# Patient Record
Sex: MALE | Race: BLACK OR AFRICAN AMERICAN | NOT HISPANIC OR LATINO | Employment: FULL TIME | URBAN - METROPOLITAN AREA
[De-identification: names, ages, dates, MRNs, and addresses within clinical notes are randomized per-mention and may not be internally consistent; named-entity substitution may affect disease eponyms.]

---

## 2022-05-18 ENCOUNTER — APPOINTMENT (OUTPATIENT)
Dept: GENERAL RADIOLOGY | Facility: HOSPITAL | Age: 42
End: 2022-05-18

## 2022-05-18 ENCOUNTER — HOSPITAL ENCOUNTER (EMERGENCY)
Facility: HOSPITAL | Age: 42
Discharge: HOME | End: 2022-05-18
Attending: EMERGENCY MEDICINE | Admitting: EMERGENCY MEDICINE

## 2022-05-18 VITALS
HEART RATE: 94 BPM | SYSTOLIC BLOOD PRESSURE: 126 MMHG | OXYGEN SATURATION: 93 % | HEIGHT: 76 IN | TEMPERATURE: 96.9 F | DIASTOLIC BLOOD PRESSURE: 68 MMHG | RESPIRATION RATE: 20 BRPM

## 2022-05-18 DIAGNOSIS — J45.901 EXACERBATION OF ASTHMA, UNSPECIFIED ASTHMA SEVERITY, UNSPECIFIED WHETHER PERSISTENT: Primary | ICD-10-CM

## 2022-05-18 LAB
ALBUMIN SERPL-MCNC: 4.6 G/DL (ref 3.5–5.2)
ALBUMIN/GLOB SERPL: 1.8 G/DL
ALP SERPL-CCNC: 75 U/L (ref 39–117)
ALT SERPL W P-5'-P-CCNC: 20 U/L (ref 1–41)
ANION GAP SERPL CALCULATED.3IONS-SCNC: 11.6 MMOL/L (ref 5–15)
AST SERPL-CCNC: 26 U/L (ref 1–40)
BASOPHILS # BLD AUTO: 0.04 10*3/MM3 (ref 0–0.2)
BASOPHILS NFR BLD AUTO: 0.6 % (ref 0–1.5)
BILIRUB SERPL-MCNC: 0.5 MG/DL (ref 0–1.2)
BUN SERPL-MCNC: 11 MG/DL (ref 6–20)
BUN/CREAT SERPL: 8.7 (ref 7–25)
CALCIUM SPEC-SCNC: 9.3 MG/DL (ref 8.6–10.5)
CHLORIDE SERPL-SCNC: 106 MMOL/L (ref 98–107)
CO2 SERPL-SCNC: 25.4 MMOL/L (ref 22–29)
CREAT SERPL-MCNC: 1.26 MG/DL (ref 0.76–1.27)
DEPRECATED RDW RBC AUTO: 41.2 FL (ref 37–54)
EGFRCR SERPLBLD CKD-EPI 2021: 72.1 ML/MIN/1.73
EOSINOPHIL # BLD AUTO: 0.55 10*3/MM3 (ref 0–0.4)
EOSINOPHIL NFR BLD AUTO: 8.2 % (ref 0.3–6.2)
ERYTHROCYTE [DISTWIDTH] IN BLOOD BY AUTOMATED COUNT: 12.7 % (ref 12.3–15.4)
GLOBULIN UR ELPH-MCNC: 2.6 GM/DL
GLUCOSE SERPL-MCNC: 61 MG/DL (ref 65–99)
HCT VFR BLD AUTO: 46.9 % (ref 37.5–51)
HGB BLD-MCNC: 15.4 G/DL (ref 13–17.7)
IMM GRANULOCYTES # BLD AUTO: 0.01 10*3/MM3 (ref 0–0.05)
IMM GRANULOCYTES NFR BLD AUTO: 0.1 % (ref 0–0.5)
LYMPHOCYTES # BLD AUTO: 3.4 10*3/MM3 (ref 0.7–3.1)
LYMPHOCYTES NFR BLD AUTO: 50.7 % (ref 19.6–45.3)
MCH RBC QN AUTO: 29.2 PG (ref 26.6–33)
MCHC RBC AUTO-ENTMCNC: 32.8 G/DL (ref 31.5–35.7)
MCV RBC AUTO: 88.8 FL (ref 79–97)
MONOCYTES # BLD AUTO: 0.58 10*3/MM3 (ref 0.1–0.9)
MONOCYTES NFR BLD AUTO: 8.6 % (ref 5–12)
NEUTROPHILS NFR BLD AUTO: 2.13 10*3/MM3 (ref 1.7–7)
NEUTROPHILS NFR BLD AUTO: 31.8 % (ref 42.7–76)
NRBC BLD AUTO-RTO: 0 /100 WBC (ref 0–0.2)
PLATELET # BLD AUTO: 148 10*3/MM3 (ref 140–450)
PMV BLD AUTO: 12.4 FL (ref 6–12)
POTASSIUM SERPL-SCNC: 3.5 MMOL/L (ref 3.5–5.2)
PROT SERPL-MCNC: 7.2 G/DL (ref 6–8.5)
QT INTERVAL: 362 MS
RBC # BLD AUTO: 5.28 10*6/MM3 (ref 4.14–5.8)
SODIUM SERPL-SCNC: 143 MMOL/L (ref 136–145)
TROPONIN T SERPL-MCNC: <0.01 NG/ML (ref 0–0.03)
WBC NRBC COR # BLD: 6.71 10*3/MM3 (ref 3.4–10.8)

## 2022-05-18 PROCEDURE — 99284 EMERGENCY DEPT VISIT MOD MDM: CPT

## 2022-05-18 PROCEDURE — 94640 AIRWAY INHALATION TREATMENT: CPT

## 2022-05-18 PROCEDURE — 93005 ELECTROCARDIOGRAM TRACING: CPT | Performed by: EMERGENCY MEDICINE

## 2022-05-18 PROCEDURE — 93010 ELECTROCARDIOGRAM REPORT: CPT | Performed by: INTERNAL MEDICINE

## 2022-05-18 PROCEDURE — 80053 COMPREHEN METABOLIC PANEL: CPT | Performed by: EMERGENCY MEDICINE

## 2022-05-18 PROCEDURE — 71045 X-RAY EXAM CHEST 1 VIEW: CPT

## 2022-05-18 PROCEDURE — 94799 UNLISTED PULMONARY SVC/PX: CPT

## 2022-05-18 PROCEDURE — 84484 ASSAY OF TROPONIN QUANT: CPT | Performed by: EMERGENCY MEDICINE

## 2022-05-18 PROCEDURE — 96374 THER/PROPH/DIAG INJ IV PUSH: CPT

## 2022-05-18 PROCEDURE — 85025 COMPLETE CBC W/AUTO DIFF WBC: CPT | Performed by: EMERGENCY MEDICINE

## 2022-05-18 PROCEDURE — 25010000002 METHYLPREDNISOLONE PER 125 MG: Performed by: EMERGENCY MEDICINE

## 2022-05-18 PROCEDURE — 94761 N-INVAS EAR/PLS OXIMETRY MLT: CPT

## 2022-05-18 PROCEDURE — 94664 DEMO&/EVAL PT USE INHALER: CPT

## 2022-05-18 RX ORDER — ALBUTEROL SULFATE 2.5 MG/3ML
SOLUTION RESPIRATORY (INHALATION)
Status: COMPLETED
Start: 2022-05-18 | End: 2022-05-18

## 2022-05-18 RX ORDER — METHYLPREDNISOLONE SODIUM SUCCINATE 125 MG/2ML
125 INJECTION, POWDER, LYOPHILIZED, FOR SOLUTION INTRAMUSCULAR; INTRAVENOUS ONCE
Status: COMPLETED | OUTPATIENT
Start: 2022-05-18 | End: 2022-05-18

## 2022-05-18 RX ORDER — ALBUTEROL SULFATE 2.5 MG/3ML
10 SOLUTION RESPIRATORY (INHALATION)
Status: COMPLETED | OUTPATIENT
Start: 2022-05-18 | End: 2022-05-18

## 2022-05-18 RX ORDER — SODIUM CHLORIDE 0.9 % (FLUSH) 0.9 %
10 SYRINGE (ML) INJECTION AS NEEDED
Status: DISCONTINUED | OUTPATIENT
Start: 2022-05-18 | End: 2022-05-18 | Stop reason: HOSPADM

## 2022-05-18 RX ORDER — ALBUTEROL SULFATE 90 UG/1
2 AEROSOL, METERED RESPIRATORY (INHALATION) EVERY 4 HOURS PRN
Qty: 18 G | Refills: 3 | Status: SHIPPED | OUTPATIENT
Start: 2022-05-18

## 2022-05-18 RX ORDER — PREDNISONE 50 MG/1
50 TABLET ORAL DAILY
Qty: 7 TABLET | Refills: 0 | OUTPATIENT
Start: 2022-05-18 | End: 2022-10-15

## 2022-05-18 RX ADMIN — IPRATROPIUM BROMIDE 0.5 MG: 0.5 SOLUTION RESPIRATORY (INHALATION) at 06:33

## 2022-05-18 RX ADMIN — ALBUTEROL SULFATE 10 MG: 2.5 SOLUTION RESPIRATORY (INHALATION) at 06:32

## 2022-05-18 RX ADMIN — METHYLPREDNISOLONE SODIUM SUCCINATE 125 MG: 125 INJECTION, POWDER, FOR SOLUTION INTRAMUSCULAR; INTRAVENOUS at 06:25

## 2022-05-18 NOTE — ED NOTES
"Pt states that he ran out of his usual inhaler and said that the albuterol with the \"orange cap\" does not work well for him and that the inhaler with the \"blue cap\" is the inhaler that he ran out of that normally works when he becomes short of breath. Pt has been typing on his phone to communicate due to his shortness of breath   "

## 2022-05-18 NOTE — ED TRIAGE NOTES
Patient ambulated to ambulance bay doors and then fell to floor and motioned that he can not breath. Patient unable to form sentences, answers questions with gestures. Patient in tripod position. Reports he has asthma and has used his rescue inhaler without relief. He reports symptoms have been ongoing all week. MD assessed patient in ambulance bay, patient was moved via stretcher to room 10. RT paged.    Triage staff wearing appropriate PPE.

## 2022-05-18 NOTE — ED PROVIDER NOTES
EMERGENCY DEPARTMENT ENCOUNTER    Room Number:  10/10  Date of encounter:  5/18/2022  PCP: Provider, No Known  Historian: Patient      I used full protective equipment while examining this patient.  This includes face mask, gloves and protective eyewear.  I washed my hands before entering the room and immediately upon leaving the room      HPI:  Chief Complaint: Shortness of breath  A complete HPI/ROS/PMH/PSH/SH/FH are unobtainable due to: Nothing    Context: Gil Galicia is a 42 y.o. male who presents to the ED c/o worsening shortness of breath.  Patient states over the past several days he has had continuously worsening shortness of breath.  He has a difficult time speaking secondary to shortness of breath.  He states he has a history of asthma and has been without his rescue inhaler.  Patient actually walked up to the ambulance door and came in to the ER.  He denies any overt fever, chest pain.  He does not take any inhaled steroids.  He states he has never been intubated for his asthma.  He is new to the area and does not have a family doctor.    Review of Medical Records  No previous pertinent medical records found in [facility].    PAST MEDICAL HISTORY  Active Ambulatory Problems     Diagnosis Date Noted   • No Active Ambulatory Problems     Resolved Ambulatory Problems     Diagnosis Date Noted   • No Resolved Ambulatory Problems     No Additional Past Medical History         PAST SURGICAL HISTORY  No past surgical history on file.      FAMILY HISTORY  No family history on file.      SOCIAL HISTORY  Social History     Socioeconomic History   • Marital status: Single         ALLERGIES  Patient has no known allergies.        REVIEW OF SYSTEMS  All systems reviewed and negative except for those discussed in HPI.       PHYSICAL EXAM    I have reviewed the triage vital signs and nursing notes.    ED Triage Vitals [05/18/22 0633]   Temp Heart Rate Resp BP SpO2   -- 90 -- -- 95 %      Temp src Heart Rate Source Patient  Position BP Location FiO2 (%)   -- -- -- -- --       Physical Exam  GENERAL: Alert, oriented, moderate respiratory distress  HENT: head atraumatic, no nuchal rigidity  EYES: no scleral icterus, EOMI  CV: regular rhythm, regular rate, no murmur  RESPIRATORY: Poor airway movement,  only able to speak in short sentences.  Using accessory muscles.  Diffuse, moderate wheezing.  ABDOMEN: soft, nontender  MUSCULOSKELETAL: no deformity, FROM, no calf swelling or tenderness  NEURO: alert, moves all extremities, follows commands  SKIN: warm, dry        LAB RESULTS  Recent Results (from the past 24 hour(s))   Comprehensive Metabolic Panel    Collection Time: 05/18/22  6:28 AM    Specimen: Blood   Result Value Ref Range    Glucose 61 (L) 65 - 99 mg/dL    BUN 11 6 - 20 mg/dL    Creatinine 1.26 0.76 - 1.27 mg/dL    Sodium 143 136 - 145 mmol/L    Potassium 3.5 3.5 - 5.2 mmol/L    Chloride 106 98 - 107 mmol/L    CO2 25.4 22.0 - 29.0 mmol/L    Calcium 9.3 8.6 - 10.5 mg/dL    Total Protein 7.2 6.0 - 8.5 g/dL    Albumin 4.60 3.50 - 5.20 g/dL    ALT (SGPT) 20 1 - 41 U/L    AST (SGOT) 26 1 - 40 U/L    Alkaline Phosphatase 75 39 - 117 U/L    Total Bilirubin 0.5 0.0 - 1.2 mg/dL    Globulin 2.6 gm/dL    A/G Ratio 1.8 g/dL    BUN/Creatinine Ratio 8.7 7.0 - 25.0    Anion Gap 11.6 5.0 - 15.0 mmol/L    eGFR 72.1 >60.0 mL/min/1.73   Troponin    Collection Time: 05/18/22  6:28 AM    Specimen: Blood   Result Value Ref Range    Troponin T <0.010 0.000 - 0.030 ng/mL   CBC Auto Differential    Collection Time: 05/18/22  6:28 AM    Specimen: Blood   Result Value Ref Range    WBC 6.71 3.40 - 10.80 10*3/mm3    RBC 5.28 4.14 - 5.80 10*6/mm3    Hemoglobin 15.4 13.0 - 17.7 g/dL    Hematocrit 46.9 37.5 - 51.0 %    MCV 88.8 79.0 - 97.0 fL    MCH 29.2 26.6 - 33.0 pg    MCHC 32.8 31.5 - 35.7 g/dL    RDW 12.7 12.3 - 15.4 %    RDW-SD 41.2 37.0 - 54.0 fl    MPV 12.4 (H) 6.0 - 12.0 fL    Platelets 148 140 - 450 10*3/mm3    Neutrophil % 31.8 (L) 42.7 - 76.0 %     Lymphocyte % 50.7 (H) 19.6 - 45.3 %    Monocyte % 8.6 5.0 - 12.0 %    Eosinophil % 8.2 (H) 0.3 - 6.2 %    Basophil % 0.6 0.0 - 1.5 %    Immature Grans % 0.1 0.0 - 0.5 %    Neutrophils, Absolute 2.13 1.70 - 7.00 10*3/mm3    Lymphocytes, Absolute 3.40 (H) 0.70 - 3.10 10*3/mm3    Monocytes, Absolute 0.58 0.10 - 0.90 10*3/mm3    Eosinophils, Absolute 0.55 (H) 0.00 - 0.40 10*3/mm3    Basophils, Absolute 0.04 0.00 - 0.20 10*3/mm3    Immature Grans, Absolute 0.01 0.00 - 0.05 10*3/mm3    nRBC 0.0 0.0 - 0.2 /100 WBC   ECG 12 Lead    Collection Time: 05/18/22  7:36 AM   Result Value Ref Range    QT Interval 362 ms       Ordered the above labs and independently reviewed the results.        RADIOLOGY  XR Chest 1 View    Result Date: 5/18/2022  PORTABLE CHEST X-RAY  HISTORY: Shortness of breath.  Portable chest x-ray is provided. Correlation: None.  FINDINGS: The cardiomediastinal silhouette is normal. The lungs are clear. The costophrenic sulci are dry and the bones appear normal. There is no pneumothorax.      Negative.  This report was finalized on 5/18/2022 7:56 AM by Dr. Landon Santiago M.D.        I ordered the above noted radiological studies. Reviewed by me and discussed with radiologist.  See dictation for official radiology interpretation.      MEDICATIONS GIVEN IN ER    Medications   sodium chloride 0.9 % flush 10 mL (has no administration in time range)   methylPREDNISolone sodium succinate (SOLU-Medrol) injection 125 mg (125 mg Intravenous Given 5/18/22 0625)   albuterol (PROVENTIL) nebulizer solution 0.083% 2.5 mg/3mL (10 mg Nebulization Given 5/18/22 0632)   ipratropium (ATROVENT) nebulizer solution 0.5 mg (0.5 mg Nebulization Given 5/18/22 0633)         PROGRESS, DATA ANALYSIS, CONSULTS, AND MEDICAL DECISION MAKING    All labs have been independently reviewed by me.  All radiology studies have been reviewed by me and discussed with radiologist dictating the report.   EKG's independently viewed and interpreted by  me.  Discussion below represents my analysis of pertinent findings related to patient's condition, differential diagnosis, treatment plan and final disposition.    I have discussed case with Dr. Uribe, emergency room physician.  He has performed his own bedside examination and agrees with treatment plan.    ED Course as of 05/18/22 1007   Wed May 18, 2022   0630 Patient presents to ER with shortness of breath and reports of asthma exacerbation.  Steroids, continuous albuterol treatment ordered. [EE]   0657 Recheck of patient.  He is now able to speak.  He is moving air much better, however still wheezing. [EE]   0703 WBC: 6.71 [EE]   0703 Hemoglobin: 15.4 [EE]   0706 Reevaluation: Patient is breathing much better tolerating continuous nebulization very well.  No longer in respiratory distress [TJ]   0748 Recheck of patient.  His lungs are most completely clear. [EE]   0748 Chest x-ray interpreted myself shows no acute infiltrate. [EE]   0750 EKG interpreted myself.  Time 0736.  Sinus rhythm, 90 bpm.  Normal P/TESSA.  QRS normal normal axis.  No significant ST abnormalities.  No previous for comparison. [EE]   0805 Updated patient on work-up.  Patient states he feels much better.  He has stable vitals.  I believe he is safe for discharge.  We will send him home on steroids and an albuterol inhaler.  We will give him the name of physician to follow-up with. [EE]      ED Course User Index  [EE] Bjorn Segovia PA  [TJ] Darwin Uribe MD       AS OF 10:07 EDT VITALS:    BP - 126/68  HR - 94  TEMP - 96.9 °F (36.1 °C) (Tympanic)  O2 SATS - 93%        DIAGNOSIS  Final diagnoses:   Exacerbation of asthma, unspecified asthma severity, unspecified whether persistent         DISPOSITION  Discharged      Dictated utilizing Dragon dictation     Bjorn Segovia PA  05/18/22 1007

## 2022-05-18 NOTE — ED PROVIDER NOTES
MD ATTESTATION NOTE    The GARY and I have discussed this patient's history, physical exam, and treatment plan.    I provided a substantive portion of the care of this patient. I personally performed the physical exam, in its entirety. The attached note describes my personal findings.      Gil Galicia is a 42 y.o. male who presents to the ED c/o shortness of breath has been getting worse over the past couple days.  Patient is an asthmatic states his inhaler has not been working.  Patient arrived after essentially collapsing in the ambulance bay.  Patient ANTONIA negates via his phone secondary to extreme shortness of breath and clear respiratory distress      On exam:  GENERAL: Moderate distressed  HENT: nares patent  EYES: no scleral icterus  CV: regular rhythm, regular rate  RESPIRATORY: Creased work of breathing, shallow breaths, utilizing accessory muscles able to communicate in single words, lungs are wheezy to auscultation  ABDOMEN: soft, nontender nondistended  MUSCULOSKELETAL: no deformity  NEURO: alert, moves all extremities, follows commands  SKIN: warm, dry    Labs  No results found for this or any previous visit (from the past 24 hour(s)).    Radiology  No Radiology Exams Resulted Within Past 24 Hours    Medical Decision Making:  ED Course as of 05/21/22 0644   Wed May 18, 2022   0630 Patient presents to ER with shortness of breath and reports of asthma exacerbation.  Steroids, continuous albuterol treatment ordered. [EE]   0657 Recheck of patient.  He is now able to speak.  He is moving air much better, however still wheezing. [EE]   0703 WBC: 6.71 [EE]   0703 Hemoglobin: 15.4 [EE]   0706 Reevaluation: Patient is breathing much better tolerating continuous nebulization very well.  No longer in respiratory distress [TJ]   0748 Recheck of patient.  His lungs are most completely clear. [EE]   0748 Chest x-ray interpreted myself shows no acute infiltrate. [EE]   0750 EKG interpreted myself.  Time 0736.  Sinus  rhythm, 90 bpm.  Normal P/TESSA.  QRS normal normal axis.  No significant ST abnormalities.  No previous for comparison. [EE]   0805 Updated patient on work-up.  Patient states he feels much better.  He has stable vitals.  I believe he is safe for discharge.  We will send him home on steroids and an albuterol inhaler.  We will give him the name of physician to follow-up with. [EE]      ED Course User Index  [EE] Bjorn Segovia PA  [TJ] Darwin Uribe MD       Critical Care  Performed by: Darwin Uribe MD  Authorized by: Darwin Uribe MD     Critical care provider statement:     Critical care time (minutes):  39    Critical care was necessary to treat or prevent imminent or life-threatening deterioration of the following conditions:  Respiratory failure    Critical care was time spent personally by me on the following activities:  Development of treatment plan with patient or surrogate, evaluation of patient's response to treatment, examination of patient, ordering and performing treatments and interventions, ordering and review of laboratory studies, ordering and review of radiographic studies, pulse oximetry and re-evaluation of patient's condition          PPE: Both the patient and I wore a surgical mask throughout the entire patient encounter. I wore protective goggles.     Diagnosis  Final diagnoses:   Exacerbation of asthma, unspecified asthma severity, unspecified whether persistent        Darwin Uribe MD  05/18/22 0707       Darwin Uribe MD  05/21/22 0644

## 2022-10-15 ENCOUNTER — APPOINTMENT (OUTPATIENT)
Dept: GENERAL RADIOLOGY | Facility: HOSPITAL | Age: 42
End: 2022-10-15

## 2022-10-15 ENCOUNTER — HOSPITAL ENCOUNTER (EMERGENCY)
Facility: HOSPITAL | Age: 42
Discharge: HOME | End: 2022-10-15
Attending: EMERGENCY MEDICINE | Admitting: EMERGENCY MEDICINE

## 2022-10-15 VITALS
OXYGEN SATURATION: 97 % | DIASTOLIC BLOOD PRESSURE: 101 MMHG | BODY MASS INDEX: 33.49 KG/M2 | TEMPERATURE: 97.8 F | SYSTOLIC BLOOD PRESSURE: 142 MMHG | WEIGHT: 275 LBS | RESPIRATION RATE: 20 BRPM | HEART RATE: 93 BPM | HEIGHT: 76 IN

## 2022-10-15 DIAGNOSIS — J45.41 MODERATE PERSISTENT ASTHMA WITH ACUTE EXACERBATION: Primary | ICD-10-CM

## 2022-10-15 DIAGNOSIS — I10 HYPERTENSION, UNSPECIFIED TYPE: ICD-10-CM

## 2022-10-15 LAB
ALBUMIN SERPL-MCNC: 4.5 G/DL (ref 3.5–5.2)
ALBUMIN/GLOB SERPL: 1.8 G/DL
ALP SERPL-CCNC: 71 U/L (ref 39–117)
ALT SERPL W P-5'-P-CCNC: 16 U/L (ref 1–41)
ANION GAP SERPL CALCULATED.3IONS-SCNC: 13 MMOL/L (ref 5–15)
AST SERPL-CCNC: 26 U/L (ref 1–40)
BASOPHILS # BLD AUTO: 0.04 10*3/MM3 (ref 0–0.2)
BASOPHILS NFR BLD AUTO: 0.7 % (ref 0–1.5)
BILIRUB SERPL-MCNC: 0.3 MG/DL (ref 0–1.2)
BUN SERPL-MCNC: 8 MG/DL (ref 6–20)
BUN/CREAT SERPL: 6.7 (ref 7–25)
CALCIUM SPEC-SCNC: 9.5 MG/DL (ref 8.6–10.5)
CHLORIDE SERPL-SCNC: 103 MMOL/L (ref 98–107)
CO2 SERPL-SCNC: 27 MMOL/L (ref 22–29)
CREAT SERPL-MCNC: 1.19 MG/DL (ref 0.76–1.27)
DEPRECATED RDW RBC AUTO: 39.6 FL (ref 37–54)
EGFRCR SERPLBLD CKD-EPI 2021: 77.2 ML/MIN/1.73
EOSINOPHIL # BLD AUTO: 0.5 10*3/MM3 (ref 0–0.4)
EOSINOPHIL NFR BLD AUTO: 8.4 % (ref 0.3–6.2)
ERYTHROCYTE [DISTWIDTH] IN BLOOD BY AUTOMATED COUNT: 12.2 % (ref 12.3–15.4)
GLOBULIN UR ELPH-MCNC: 2.5 GM/DL
GLUCOSE SERPL-MCNC: 88 MG/DL (ref 65–99)
HCT VFR BLD AUTO: 45.8 % (ref 37.5–51)
HGB BLD-MCNC: 15 G/DL (ref 13–17.7)
IMM GRANULOCYTES # BLD AUTO: 0 10*3/MM3 (ref 0–0.05)
IMM GRANULOCYTES NFR BLD AUTO: 0 % (ref 0–0.5)
LYMPHOCYTES # BLD AUTO: 2.11 10*3/MM3 (ref 0.7–3.1)
LYMPHOCYTES NFR BLD AUTO: 35.3 % (ref 19.6–45.3)
MAGNESIUM SERPL-MCNC: 2.4 MG/DL (ref 1.6–2.6)
MCH RBC QN AUTO: 29.1 PG (ref 26.6–33)
MCHC RBC AUTO-ENTMCNC: 32.8 G/DL (ref 31.5–35.7)
MCV RBC AUTO: 88.8 FL (ref 79–97)
MONOCYTES # BLD AUTO: 0.43 10*3/MM3 (ref 0.1–0.9)
MONOCYTES NFR BLD AUTO: 7.2 % (ref 5–12)
NEUTROPHILS NFR BLD AUTO: 2.9 10*3/MM3 (ref 1.7–7)
NEUTROPHILS NFR BLD AUTO: 48.4 % (ref 42.7–76)
NRBC BLD AUTO-RTO: 0 /100 WBC (ref 0–0.2)
NT-PROBNP SERPL-MCNC: 20.6 PG/ML (ref 0–450)
PLATELET # BLD AUTO: 159 10*3/MM3 (ref 140–450)
PMV BLD AUTO: 12.7 FL (ref 6–12)
POTASSIUM SERPL-SCNC: 3.7 MMOL/L (ref 3.5–5.2)
PROCALCITONIN SERPL-MCNC: <0.02 NG/ML (ref 0–0.25)
PROT SERPL-MCNC: 7 G/DL (ref 6–8.5)
RBC # BLD AUTO: 5.16 10*6/MM3 (ref 4.14–5.8)
SARS-COV-2 RNA PNL SPEC NAA+PROBE: NOT DETECTED
SODIUM SERPL-SCNC: 143 MMOL/L (ref 136–145)
TROPONIN T SERPL-MCNC: 0.02 NG/ML (ref 0–0.03)
WBC NRBC COR # BLD: 5.98 10*3/MM3 (ref 3.4–10.8)

## 2022-10-15 PROCEDURE — 94799 UNLISTED PULMONARY SVC/PX: CPT

## 2022-10-15 PROCEDURE — 93010 ELECTROCARDIOGRAM REPORT: CPT | Performed by: INTERNAL MEDICINE

## 2022-10-15 PROCEDURE — 25010000002 MAGNESIUM SULFATE 2 GM/50ML SOLUTION: Performed by: EMERGENCY MEDICINE

## 2022-10-15 PROCEDURE — 84145 PROCALCITONIN (PCT): CPT | Performed by: EMERGENCY MEDICINE

## 2022-10-15 PROCEDURE — 96365 THER/PROPH/DIAG IV INF INIT: CPT

## 2022-10-15 PROCEDURE — 83735 ASSAY OF MAGNESIUM: CPT | Performed by: EMERGENCY MEDICINE

## 2022-10-15 PROCEDURE — 99284 EMERGENCY DEPT VISIT MOD MDM: CPT

## 2022-10-15 PROCEDURE — 25010000002 METHYLPREDNISOLONE PER 125 MG: Performed by: EMERGENCY MEDICINE

## 2022-10-15 PROCEDURE — 83880 ASSAY OF NATRIURETIC PEPTIDE: CPT | Performed by: EMERGENCY MEDICINE

## 2022-10-15 PROCEDURE — 96375 TX/PRO/DX INJ NEW DRUG ADDON: CPT

## 2022-10-15 PROCEDURE — 93005 ELECTROCARDIOGRAM TRACING: CPT | Performed by: EMERGENCY MEDICINE

## 2022-10-15 PROCEDURE — 36415 COLL VENOUS BLD VENIPUNCTURE: CPT

## 2022-10-15 PROCEDURE — 87635 SARS-COV-2 COVID-19 AMP PRB: CPT | Performed by: EMERGENCY MEDICINE

## 2022-10-15 PROCEDURE — 94640 AIRWAY INHALATION TREATMENT: CPT

## 2022-10-15 PROCEDURE — 71045 X-RAY EXAM CHEST 1 VIEW: CPT

## 2022-10-15 PROCEDURE — 85025 COMPLETE CBC W/AUTO DIFF WBC: CPT | Performed by: EMERGENCY MEDICINE

## 2022-10-15 PROCEDURE — 84484 ASSAY OF TROPONIN QUANT: CPT | Performed by: EMERGENCY MEDICINE

## 2022-10-15 PROCEDURE — 80053 COMPREHEN METABOLIC PANEL: CPT | Performed by: EMERGENCY MEDICINE

## 2022-10-15 RX ORDER — BENZONATATE 200 MG/1
200 CAPSULE ORAL 3 TIMES DAILY PRN
Qty: 21 CAPSULE | Refills: 0 | Status: SHIPPED | OUTPATIENT
Start: 2022-10-15

## 2022-10-15 RX ORDER — ALBUTEROL SULFATE 2.5 MG/3ML
2.5 SOLUTION RESPIRATORY (INHALATION) ONCE
Status: COMPLETED | OUTPATIENT
Start: 2022-10-15 | End: 2022-10-15

## 2022-10-15 RX ORDER — IPRATROPIUM BROMIDE AND ALBUTEROL SULFATE 2.5; .5 MG/3ML; MG/3ML
3 SOLUTION RESPIRATORY (INHALATION) ONCE
Status: COMPLETED | OUTPATIENT
Start: 2022-10-15 | End: 2022-10-15

## 2022-10-15 RX ORDER — METHYLPREDNISOLONE SODIUM SUCCINATE 125 MG/2ML
125 INJECTION, POWDER, LYOPHILIZED, FOR SOLUTION INTRAMUSCULAR; INTRAVENOUS ONCE
Status: COMPLETED | OUTPATIENT
Start: 2022-10-15 | End: 2022-10-15

## 2022-10-15 RX ORDER — PREDNISONE 20 MG/1
40 TABLET ORAL DAILY
Qty: 10 TABLET | Refills: 0 | Status: SHIPPED | OUTPATIENT
Start: 2022-10-15 | End: 2022-10-20

## 2022-10-15 RX ORDER — ALBUTEROL SULFATE 2.5 MG/3ML
2.5 SOLUTION RESPIRATORY (INHALATION)
Status: COMPLETED | OUTPATIENT
Start: 2022-10-15 | End: 2022-10-15

## 2022-10-15 RX ORDER — MAGNESIUM SULFATE HEPTAHYDRATE 40 MG/ML
2 INJECTION, SOLUTION INTRAVENOUS ONCE
Status: COMPLETED | OUTPATIENT
Start: 2022-10-15 | End: 2022-10-15

## 2022-10-15 RX ADMIN — ALBUTEROL SULFATE 2.5 MG: 2.5 SOLUTION RESPIRATORY (INHALATION) at 20:31

## 2022-10-15 RX ADMIN — IPRATROPIUM BROMIDE AND ALBUTEROL SULFATE 3 ML: .5; 3 SOLUTION RESPIRATORY (INHALATION) at 21:59

## 2022-10-15 RX ADMIN — ALBUTEROL SULFATE 2.5 MG: 2.5 SOLUTION RESPIRATORY (INHALATION) at 22:17

## 2022-10-15 RX ADMIN — MAGNESIUM SULFATE HEPTAHYDRATE 2 G: 2 INJECTION, SOLUTION INTRAVENOUS at 20:18

## 2022-10-15 RX ADMIN — IPRATROPIUM BROMIDE AND ALBUTEROL SULFATE 3 ML: 2.5; .5 SOLUTION RESPIRATORY (INHALATION) at 20:01

## 2022-10-15 RX ADMIN — ALBUTEROL SULFATE 2.5 MG: 2.5 SOLUTION RESPIRATORY (INHALATION) at 20:14

## 2022-10-15 RX ADMIN — METHYLPREDNISOLONE SODIUM SUCCINATE 125 MG: 125 INJECTION, POWDER, FOR SOLUTION INTRAMUSCULAR; INTRAVENOUS at 20:25

## 2022-10-15 RX ADMIN — ALBUTEROL SULFATE 2.5 MG: 2.5 SOLUTION RESPIRATORY (INHALATION) at 20:46

## 2022-10-15 NOTE — ED PROVIDER NOTES
EMERGENCY DEPARTMENT ENCOUNTER  I wore full protective equipment throughout this patient encounter including a N95 mask, eye shield, gown and gloves. Hand hygiene was performed before donning protective equipment and after removal when leaving the room.    Room Number:  16/16  Date of encounter:  10/15/2022  PCP: Provider, No Known    HPI:  Context: Gil Galicia is a 42 y.o. male who presents to the ED c/o chief complaint of shortness of breath.  Patient complains of shortness of breath for last 2 days.  Patient reports that he was just having dyspnea with exertion but is now having dyspnea at rest.  Patient reports that he is having chest tightness as well as wheezing, has been using his albuterol with some improvement but symptoms are worsening.  Patient has had cough, cough is productive of.  Patient denies any vomiting or diarrhea, no fever shakes chills or night sweats, no loss sense of smell or taste.  Patient reports history of asthma with similar exacerbations in the past.  Patient denies any recent sick contacts, no known exposure to COVID-19.    MEDICAL HISTORY REVIEW  Reviewed in EPIC    PAST MEDICAL HISTORY  Active Ambulatory Problems     Diagnosis Date Noted   • No Active Ambulatory Problems     Resolved Ambulatory Problems     Diagnosis Date Noted   • No Resolved Ambulatory Problems     No Additional Past Medical History       PAST SURGICAL HISTORY  No past surgical history on file.    FAMILY HISTORY  No family history on file.    SOCIAL HISTORY  Social History     Socioeconomic History   • Marital status: Single       ALLERGIES  Patient has no known allergies.    The patient's allergies have been reviewed    REVIEW OF SYSTEMS  All systems reviewed and negative except for those discussed in HPI.     PHYSICAL EXAM  I have reviewed the triage vital signs and nursing notes.  ED Triage Vitals [10/15/22 1954]   Temp Heart Rate Resp BP SpO2   97.8 °F (36.6 °C) 101 28 (!) 161/109 98 %      Temp src Heart  Rate Source Patient Position BP Location FiO2 (%)   Oral Monitor Standing Left arm --       General: No acute distress.  HENT: NCAT, PERRL, Nares patent.  Eyes: no scleral icterus.  Neck: trachea midline, no ROM limitations.  CV: regular rhythm, regular rate.  Respiratory: Mild respiratory distress, sitting upright, tachypneic, no sensory muscle use, extremely diminished with expiratory wheezing with prolonged expiration  Abdomen: soft, nondistended, NTTP, no rebound tenderness, no guarding or rigidity.  Musculoskeletal: no deformity.  Neuro: alert, moves all extremities, follows commands.  Skin: warm, dry.  No peripheral edema.    LAB RESULTS  Recent Results (from the past 24 hour(s))   ECG 12 Lead    Collection Time: 10/15/22  8:23 PM   Result Value Ref Range    QT Interval 359 ms   BNP    Collection Time: 10/15/22  8:26 PM    Specimen: Blood   Result Value Ref Range    proBNP 20.6 0.0 - 450.0 pg/mL   Magnesium    Collection Time: 10/15/22  8:26 PM    Specimen: Blood   Result Value Ref Range    Magnesium 2.4 1.6 - 2.6 mg/dL   Procalcitonin    Collection Time: 10/15/22  8:26 PM    Specimen: Blood   Result Value Ref Range    Procalcitonin <0.02 0.00 - 0.25 ng/mL   CBC Auto Differential    Collection Time: 10/15/22  8:26 PM    Specimen: Blood   Result Value Ref Range    WBC 5.98 3.40 - 10.80 10*3/mm3    RBC 5.16 4.14 - 5.80 10*6/mm3    Hemoglobin 15.0 13.0 - 17.7 g/dL    Hematocrit 45.8 37.5 - 51.0 %    MCV 88.8 79.0 - 97.0 fL    MCH 29.1 26.6 - 33.0 pg    MCHC 32.8 31.5 - 35.7 g/dL    RDW 12.2 (L) 12.3 - 15.4 %    RDW-SD 39.6 37.0 - 54.0 fl    MPV 12.7 (H) 6.0 - 12.0 fL    Platelets 159 140 - 450 10*3/mm3    Neutrophil % 48.4 42.7 - 76.0 %    Lymphocyte % 35.3 19.6 - 45.3 %    Monocyte % 7.2 5.0 - 12.0 %    Eosinophil % 8.4 (H) 0.3 - 6.2 %    Basophil % 0.7 0.0 - 1.5 %    Immature Grans % 0.0 0.0 - 0.5 %    Neutrophils, Absolute 2.90 1.70 - 7.00 10*3/mm3    Lymphocytes, Absolute 2.11 0.70 - 3.10 10*3/mm3     Monocytes, Absolute 0.43 0.10 - 0.90 10*3/mm3    Eosinophils, Absolute 0.50 (H) 0.00 - 0.40 10*3/mm3    Basophils, Absolute 0.04 0.00 - 0.20 10*3/mm3    Immature Grans, Absolute 0.00 0.00 - 0.05 10*3/mm3    nRBC 0.0 0.0 - 0.2 /100 WBC   COVID-19,[facility] IN-HOUSE CEPHEID/RAVINDRA NP SWAB IN TRANSPORT MEDIA 8-12 HR TAT - Swab, Nasopharynx    Collection Time: 10/15/22  8:26 PM    Specimen: Nasopharynx; Swab   Result Value Ref Range    COVID19 Not Detected Not Detected - Ref. Range   Comprehensive Metabolic Panel    Collection Time: 10/15/22  9:50 PM    Specimen: Blood   Result Value Ref Range    Glucose 88 65 - 99 mg/dL    BUN 8 6 - 20 mg/dL    Creatinine 1.19 0.76 - 1.27 mg/dL    Sodium 143 136 - 145 mmol/L    Potassium 3.7 3.5 - 5.2 mmol/L    Chloride 103 98 - 107 mmol/L    CO2 27.0 22.0 - 29.0 mmol/L    Calcium 9.5 8.6 - 10.5 mg/dL    Total Protein 7.0 6.0 - 8.5 g/dL    Albumin 4.50 3.50 - 5.20 g/dL    ALT (SGPT) 16 1 - 41 U/L    AST (SGOT) 26 1 - 40 U/L    Alkaline Phosphatase 71 39 - 117 U/L    Total Bilirubin 0.3 0.0 - 1.2 mg/dL    Globulin 2.5 gm/dL    A/G Ratio 1.8 g/dL    BUN/Creatinine Ratio 6.7 (L) 7.0 - 25.0    Anion Gap 13.0 5.0 - 15.0 mmol/L    eGFR 77.2 >60.0 mL/min/1.73   Troponin    Collection Time: 10/15/22  9:50 PM    Specimen: Blood   Result Value Ref Range    Troponin T 0.019 0.000 - 0.030 ng/mL       I ordered the above labs and reviewed the results.    RADIOLOGY  XR Chest 1 View    Result Date: 10/15/2022  SINGLE VIEW OF THE CHEST  HISTORY: Shortness of air  COMPARISON: 05/18/2022  FINDINGS: Heart size is within normal limits. No pneumothorax, pleural effusion, or acute infiltrate is seen. There is mild prominence of the perihilar interstitium. This could reflect reactive airways disease or viral pneumonia.      Mild prominence of the perihilar interstitium is nonspecific, but can be seen in the setting of reactive airways disease and viral pneumonia.  This report was finalized on 10/15/2022 9:17 PM  by Dr. Ava Rosas M.D.        I ordered the above noted radiological studies. I reviewed the images and results. I agree with the radiologist interpretation.    PROCEDURES  Procedures    MEDICATIONS GIVEN IN ER  Medications   ipratropium-albuterol (DUO-NEB) nebulizer solution 3 mL (3 mL Nebulization Given 10/15/22 2001)   magnesium sulfate 2g/50 mL (PREMIX) infusion (0 g Intravenous Stopped 10/15/22 2042)   methylPREDNISolone sodium succinate (SOLU-Medrol) injection 125 mg (125 mg Intravenous Given 10/15/22 2025)   albuterol (PROVENTIL) nebulizer solution 0.083% 2.5 mg/3mL (2.5 mg Nebulization Given 10/15/22 2046)   ipratropium-albuterol (DUO-NEB) nebulizer solution 3 mL (3 mL Nebulization Given 10/15/22 2159)   albuterol (PROVENTIL) nebulizer solution 0.083% 2.5 mg/3mL (2.5 mg Nebulization Given 10/15/22 2217)       PROGRESS, DATA ANALYSIS, CONSULTS, AND MEDICAL DECISION MAKING  A complete history and physical exam have been performed.  All available laboratory and imaging results have been reviewed by myself prior to disposition.    MDM  After the initial H&P, I discussed pertinent information from history and physical exam with patient/family.  Discussed differential diagnosis.  Discussed plan for ED evaluation/workup/treatment.  All questions answered.  Patient/family is agreeable with plan.  ED Course as of 10/15/22 2246   Sat Oct 15, 2022   1957 My differential diagnosis for dyspnea includes but is not limited to:  Asthma, COPD, pneumonia, pulmonary embolus, acute respiratory distress syndrome, pneumothorax, pleural effusion, pulmonary fibrosis, congestive heart failure, myocardial infarction, DKA, uremia, acidosis, sepsis, anemia, drug related, hyperventilation, CNS disease     [JG]   2029 EKG independently viewed and contemporaneously interpreted by ED physician. Time: 2023.  Rate 95.  Interpretation: Normal sinus rhythm, normal axis, nonspecific intraventricular conduction delay, no acute ST changes.  [JG]   2029 When compared with prior EKG on 5/18/2022, no acute changes are present. [JG]   2141 COVID-negative, discontinuing isolation. [JG]   2147 Patient reassessed.  Patient reports great symptom improvement with breathing treatments.  Patient is currently breathing easily, still on nasal cannula at 3 L, satting 100%.  Nasal cannula put turned off.  Repeat lung exam: Dramatically improved air movement, end expiratory wheeze.  Patient observed off oxygen, maintain sat of 98%.  Discussed ED work-up and results to present, discussed plan for additional breathing treatment.  Patient has no questions or concerns at present. [JG]   2245 Patient reassessed.  Patient reports symptom resolution with breathing treatment.  Repeat lung exam performed: Clear to auscultation bilaterally, no longer diminished, no wheezing.  Discussed ED work-up and results, discussed plan for discharge with steroids.  Discussed need for frequent breathing treatments.  Discussed need for close follow-up with primary care.  Extensive discussion return precautions, discharging. [JG]      ED Course User Index  [JG] Magdaleno Barney MD       AS OF 22:46 EDT VITALS:    BP - (!) 161/109  HR - 93  TEMP - 97.8 °F (36.6 °C) (Oral)  O2 SATS - 94%    DIAGNOSIS  Final diagnoses:   Moderate persistent asthma with acute exacerbation   Hypertension, unspecified type         DISPOSITION  DISCHARGE    Patient discharged in stable condition.    Reviewed implications of results, diagnosis, meds, responsibility to follow up, warning signs and symptoms of possible worsening, potential complications and reasons to return to ER.    Patient/Family voiced understanding of above instructions.    Discussed plan for discharge, as there is no emergent indication for admission. Patient referred to primary care provider for BP management due to today's BP. Pt/family is agreeable and understands need for follow up and repeat testing.  Pt is aware that discharge does not  mean that nothing is wrong but it indicates no emergency is present that requires admission and they must continue care with follow-up as given below or physician of their choice.     FOLLOW-UP  Your PCP    Schedule an appointment as soon as possible for a visit in 2 days  even if well    [facility] - [place] [id number] [phone]    if you are unable to follow up with your PCP         Medication List      New Prescriptions    benzonatate 200 MG capsule  Commonly known as: TESSALON  Take 1 capsule by mouth 3 (Three) Times a Day As Needed for Cough.        Changed    predniSONE 20 MG tablet  Commonly known as: DELTASONE  Take 2 tablets by mouth Daily for 5 days.  What changed:   · medication strength  · how much to take           Where to Get Your Medications      These medications were sent to [facility]/pharmacy #[id number] - [place], [place] - [address] - [phone]  - [phone] FX  [address] [facility] [address]    Phone: [phone]   · benzonatate 200 MG capsule  · predniSONE 20 MG tablet          Magdaleno Barney MD  10/15/22 6890

## 2022-10-15 NOTE — ED TRIAGE NOTES
Pt presents to the ER from home via pvt car, pt was at EMS entrance motioning that he could not breath while holding his inhaler.  Pt typed out on his phone that he has been this way for 2 days, took treatments at home with his nebulizer with no improvements.

## 2022-10-16 LAB — QT INTERVAL: 359 MS
